# Patient Record
Sex: MALE | Race: WHITE | NOT HISPANIC OR LATINO | Employment: FULL TIME | URBAN - METROPOLITAN AREA
[De-identification: names, ages, dates, MRNs, and addresses within clinical notes are randomized per-mention and may not be internally consistent; named-entity substitution may affect disease eponyms.]

---

## 2023-12-15 ENCOUNTER — OFFICE VISIT (OUTPATIENT)
Dept: RADIATION ONCOLOGY | Facility: CLINIC | Age: 63
End: 2023-12-15
Payer: COMMERCIAL

## 2023-12-15 VITALS
DIASTOLIC BLOOD PRESSURE: 87 MMHG | RESPIRATION RATE: 18 BRPM | BODY MASS INDEX: 27.28 KG/M2 | WEIGHT: 194.88 LBS | HEART RATE: 69 BPM | SYSTOLIC BLOOD PRESSURE: 131 MMHG | HEIGHT: 71 IN | OXYGEN SATURATION: 98 % | TEMPERATURE: 98 F

## 2023-12-15 DIAGNOSIS — C09.0 MALIGNANT NEOPLASM OF TONSILLAR FOSSA: Primary | ICD-10-CM

## 2023-12-15 PROCEDURE — 3008F BODY MASS INDEX DOCD: CPT | Mod: CPTII,S$GLB,, | Performed by: SPECIALIST

## 2023-12-15 PROCEDURE — 3079F PR MOST RECENT DIASTOLIC BLOOD PRESSURE 80-89 MM HG: ICD-10-PCS | Mod: CPTII,S$GLB,, | Performed by: SPECIALIST

## 2023-12-15 PROCEDURE — 99999 PR PBB SHADOW E&M-EST. PATIENT-LVL III: CPT | Mod: PBBFAC,,, | Performed by: SPECIALIST

## 2023-12-15 PROCEDURE — 3075F SYST BP GE 130 - 139MM HG: CPT | Mod: CPTII,S$GLB,, | Performed by: SPECIALIST

## 2023-12-15 PROCEDURE — 3008F PR BODY MASS INDEX (BMI) DOCUMENTED: ICD-10-PCS | Mod: CPTII,S$GLB,, | Performed by: SPECIALIST

## 2023-12-15 PROCEDURE — 1159F PR MEDICATION LIST DOCUMENTED IN MEDICAL RECORD: ICD-10-PCS | Mod: CPTII,S$GLB,, | Performed by: SPECIALIST

## 2023-12-15 PROCEDURE — 99213 OFFICE O/P EST LOW 20 MIN: CPT | Mod: S$GLB,,, | Performed by: SPECIALIST

## 2023-12-15 PROCEDURE — 99999 PR PBB SHADOW E&M-EST. PATIENT-LVL III: ICD-10-PCS | Mod: PBBFAC,,, | Performed by: SPECIALIST

## 2023-12-15 PROCEDURE — 3075F PR MOST RECENT SYSTOLIC BLOOD PRESS GE 130-139MM HG: ICD-10-PCS | Mod: CPTII,S$GLB,, | Performed by: SPECIALIST

## 2023-12-15 PROCEDURE — 99213 PR OFFICE/OUTPT VISIT, EST, LEVL III, 20-29 MIN: ICD-10-PCS | Mod: S$GLB,,, | Performed by: SPECIALIST

## 2023-12-15 PROCEDURE — 3079F DIAST BP 80-89 MM HG: CPT | Mod: CPTII,S$GLB,, | Performed by: SPECIALIST

## 2023-12-15 PROCEDURE — 1159F MED LIST DOCD IN RCRD: CPT | Mod: CPTII,S$GLB,, | Performed by: SPECIALIST

## 2023-12-15 RX ORDER — LEVOTHYROXINE SODIUM 50 UG/1
75 TABLET ORAL
COMMUNITY
Start: 2023-03-18

## 2023-12-15 RX ORDER — ATORVASTATIN CALCIUM 20 MG/1
20 TABLET, FILM COATED ORAL
COMMUNITY
Start: 2023-10-25

## 2023-12-15 RX ORDER — ACETAMINOPHEN 325 MG/1
650 TABLET ORAL EVERY 6 HOURS PRN
COMMUNITY

## 2023-12-15 RX ORDER — NAPROXEN SODIUM 220 MG
220 TABLET ORAL DAILY PRN
COMMUNITY

## 2023-12-15 RX ORDER — HYDROCODONE BITARTRATE AND ACETAMINOPHEN 7.5; 325 MG/1; MG/1
TABLET ORAL
COMMUNITY

## 2023-12-15 NOTE — PROGRESS NOTES
"Radiation oncology follow up note    Referring physician:  Cal Shea MD    ENT:  Miguel Garcia MD    HISTORY OF PRESENT ILLNESS:  See 09.0:  CT3 N2 B M0 P 16 positive left tonsil/base of tongue squamous cell carcinoma in an otherwise healthy 58-year-old man with longstanding tobacco history, good social support, and good disease insight.  He completed combined cisplatin and radiotherapy on 11/14/2019 and has since remained LUZ      INTERVAL HISTORY:  He returns for routine six-month follow-up.  He describes subtle nighttime xerostomia, but otherwise it is entirely without treatment complaints and denies complaints worrisome for local regional or metastatic recurrence.  He has not undergone any reimaging since our last visit.      PHYSICAL EXAMINATION:  Vitals:    12/15/23 1424   BP: 131/87   Pulse: 69   Resp: 18   Temp: 97.6 °F (36.4 °C)   SpO2: 98%   Weight: 88.4 kg (194 lb 14.2 oz)   Height: 5' 11" (1.803 m)      General:  A&O x4, NAD  HEENT:  PEERLA, CN II-XII intact, EOM intact, examination of the oral cavity and oropharynx by direct visualization and bimanual palpation shows an edentulous mouth with moist mucous membranes and no mass or mucosal lesions, including radiation sequela.  Fiberoptic exam through the right nostril following topical application of lidocaine shows a normal-appearing pharynx and larynx including no mass or mucosal lesions, symmetrically mobile true vocal folds, and no sequela of radiotherapy    Lymphatics:  no cervical/sclav LAD  Lungs:  CTAB  Heart:  RRR  Abdomen:  NTND +BS, no HSM      ASSESSMENT:  Clinically LUZ      PLAN:  Follow up in 6 months.  He sees Dr. Diaz in 3 months.  He also maintains follow up with Dr. Shea, seen there today        "

## 2024-06-21 ENCOUNTER — OFFICE VISIT (OUTPATIENT)
Dept: RADIATION ONCOLOGY | Facility: CLINIC | Age: 64
End: 2024-06-21
Payer: COMMERCIAL

## 2024-06-21 VITALS
TEMPERATURE: 97 F | RESPIRATION RATE: 18 BRPM | SYSTOLIC BLOOD PRESSURE: 126 MMHG | HEART RATE: 58 BPM | HEIGHT: 71 IN | BODY MASS INDEX: 25.62 KG/M2 | DIASTOLIC BLOOD PRESSURE: 83 MMHG | WEIGHT: 183 LBS | OXYGEN SATURATION: 97 %

## 2024-06-21 DIAGNOSIS — C09.0 MALIGNANT NEOPLASM OF TONSILLAR FOSSA: Primary | ICD-10-CM

## 2024-06-21 PROCEDURE — 99999 PR PBB SHADOW E&M-EST. PATIENT-LVL III: CPT | Mod: PBBFAC,,, | Performed by: SPECIALIST

## 2024-06-21 NOTE — PROGRESS NOTES
"[facility] / [facility] - Radiation Oncology Follow Up Note    Referring physician:  Cal Shea MD     ENT:  Miguel Garcia MD     HISTORY OF PRESENT ILLNESS:  C09.0:  CT3 N2 B M0 P 16 positive left tonsil/base of tongue squamous cell carcinoma in an otherwise healthy 58-year-old man with longstanding tobacco history, good social support, and good disease insight.  He completed combined cisplatin and radiotherapy on 11/14/2019 and has since remained LUZ      INTERVAL HISTORY:  He returns for routine six-month follow up.  He has persistent subtle xerostomia with no other treatment-related complaints, and denies complaints worrisome for local regional or metastatic recurrence    He underwent low-dose screening CT of the chest on 11/10/2023, by report benign      PHYSICAL EXAMINATION:  Vitals:    06/21/24 1426   BP: 126/83   Pulse: (!) 58   Resp: 18   Temp: 97.2 °F (36.2 °C)   SpO2: 97%   Weight: 83 kg (182 lb 15.7 oz)   Height: 5' 11" (1.803 m)      General:  A&O x4, NAD  HEENT:  PEERLA, CN II-XII intact, EOM intact, examination of the oral cavity and oropharynx by direct visualization and bimanual palpation shows moist mucous membranes, an edentulous mouth, and no worrisome mass or mucosal lesions, with particular attention to the left tonsil.  Fiberoptic exam through the right nostril following topical application of lidocaine shows a normal-appearing pharynx and larynx, with the exception of diffuse telangiectasia, unchanged, with no other mass or mucosal lesions.  Symmetrically mobile true vocal folds  Lymphatics:  no cervical/sclav LAD  Lungs:  CTAB  Heart:  RRR  Abdomen:  NTND +BS      ASSESSMENT:  Clinically LUZ      PLAN:  Follow up in 6 months.  He also maintains follow up with Dr. renee and Dr. Belcher        "

## 2024-12-20 ENCOUNTER — OFFICE VISIT (OUTPATIENT)
Dept: RADIATION ONCOLOGY | Facility: CLINIC | Age: 64
End: 2024-12-20
Payer: COMMERCIAL

## 2024-12-20 VITALS
DIASTOLIC BLOOD PRESSURE: 84 MMHG | HEART RATE: 56 BPM | TEMPERATURE: 98 F | HEIGHT: 71 IN | RESPIRATION RATE: 18 BRPM | SYSTOLIC BLOOD PRESSURE: 133 MMHG | BODY MASS INDEX: 25.65 KG/M2 | OXYGEN SATURATION: 97 % | WEIGHT: 183.19 LBS

## 2024-12-20 DIAGNOSIS — C09.0 MALIGNANT NEOPLASM OF TONSILLAR FOSSA: Primary | ICD-10-CM

## 2024-12-20 PROCEDURE — 99999 PR PBB SHADOW E&M-EST. PATIENT-LVL III: CPT | Mod: PBBFAC,,, | Performed by: SPECIALIST

## 2024-12-20 RX ORDER — TADALAFIL 20 MG/1
20 TABLET ORAL DAILY PRN
COMMUNITY
Start: 2024-10-11 | End: 2025-10-11

## 2024-12-20 NOTE — PROGRESS NOTES
"[facility] / [facility] - Radiation Oncology Follow Up Note    Referring physician:  Cal Shea MD     ENT:  Miguel Garcia MD     HISTORY OF PRESENT ILLNESS:  C09.0:  CT3 N2 B M0 P 16 positive left tonsil/base of tongue squamous cell carcinoma in an otherwise healthy 58-year-old man with longstanding tobacco history, good social support, and good disease insight.      He completed combined cisplatin and radiotherapy on 11/14/2019 and has since remained LUZ        INTERVAL HISTORY:  He returns for routine six-month follow up.  He has persistent subtle xerostomia with no other treatment-related complaints, and denies complaints worrisome for local regional or metastatic recurrence     He underwent low-dose screening CT of the chest on 11/10/2023, by report benign       INTERVAL HISTORY:  He returns for routine six-month follow up.  He has no new complaints related to therapy are worrisome for local regional or metastatic recurrence.  His primary treatment/cancer relating complaint is loose skin over his neck sometimes making it difficult to shave.    He saw Dr. Garcia on 10/17/2024 with negative physical exam.      PHYSICAL EXAMINATION:  Vitals:    12/20/24 1436   BP: 133/84   Pulse: (!) 56   Resp: 18   Temp: 98.1 °F (36.7 °C)   SpO2: 97%   Weight: 83.1 kg (183 lb 3.2 oz)   Height: 5' 11" (1.803 m)      General:  A&O x4, NAD  HEENT:  PEERLA, CN II-XII intact, EOM intact,   Lymphatics:  no cervical/sclav LAD  Lungs:  CTAB  Heart:  RRR  Abdomen:  NTND +BS, no HSM      ASSESSMENT:  Clinically LUZ.      PLAN:  Follow up in 6 months and yearly once thereafter.  We reviewed his small and declining but very real ongoing risk of recurrent disease.  He maintains yearly follow up with Dr. Garcia in October        "

## 2025-05-02 ENCOUNTER — OFFICE VISIT (OUTPATIENT)
Dept: RADIATION ONCOLOGY | Facility: CLINIC | Age: 65
End: 2025-05-02
Payer: COMMERCIAL

## 2025-05-02 VITALS
BODY MASS INDEX: 25.56 KG/M2 | OXYGEN SATURATION: 97 % | WEIGHT: 182.56 LBS | SYSTOLIC BLOOD PRESSURE: 136 MMHG | DIASTOLIC BLOOD PRESSURE: 85 MMHG | HEIGHT: 71 IN | TEMPERATURE: 99 F | RESPIRATION RATE: 18 BRPM | HEART RATE: 55 BPM

## 2025-05-02 DIAGNOSIS — C09.0 MALIGNANT NEOPLASM OF TONSILLAR FOSSA: Primary | ICD-10-CM

## 2025-05-02 PROCEDURE — 99999 PR PBB SHADOW E&M-EST. PATIENT-LVL III: CPT | Mod: PBBFAC,,, | Performed by: SPECIALIST

## 2025-05-02 NOTE — PROGRESS NOTES
"[facility] - Radiation Oncology Follow Up Note    Referring physician:  Cal Shea MD     ENT:  Miguel Garcia MD       HISTORY OF PRESENT ILLNESS:  C09.0:  CT3 N2 B M0 P 16 positive left tonsil/base of tongue squamous cell carcinoma in an otherwise healthy 58-year-old man with longstanding tobacco history, good social support, and good disease insight.       He completed combined cisplatin and radiotherapy on 11/14/2019 and has since remained LUZ     INTERVAL HISTORY:  He returns for routine six-month follow up.  He has no new complaints related to therapy are worrisome for local regional or metastatic recurrence.  He denies other interval change.      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  Fiberoptic exam through the right nostril following topical application of lidocaine shows similar oropharyngeal telangiectasia with no other mass or mucosal changes in the pharynx or larynx.  Symmetrically mobile true vocal folds  Lymphatics:  no cervical/sclav LAD  Lungs:  CTAB  Heart:  RRR  Abdomen:  NTND +BS      ASSESSMENT:  Clinically LUZ      PLAN:  Follow up in 1 year.  He maintains yearly follow up with Dr. savanah Brar in October and with Dr. Shea        "